# Patient Record
Sex: MALE | Race: WHITE | NOT HISPANIC OR LATINO | Employment: FULL TIME | URBAN - METROPOLITAN AREA
[De-identification: names, ages, dates, MRNs, and addresses within clinical notes are randomized per-mention and may not be internally consistent; named-entity substitution may affect disease eponyms.]

---

## 2023-01-19 PROBLEM — D58.2 ELEVATED HEMOGLOBIN (CMS-HCC): Status: ACTIVE | Noted: 2023-01-19

## 2023-01-19 PROBLEM — M54.50 CHRONIC LOW BACK PAIN: Status: ACTIVE | Noted: 2023-01-19

## 2023-01-19 PROBLEM — M54.42 ACUTE LEFT-SIDED LOW BACK PAIN WITH LEFT-SIDED SCIATICA: Status: ACTIVE | Noted: 2023-01-19

## 2023-01-19 PROBLEM — R11.15 CYCLIC VOMITING SYNDROME: Status: ACTIVE | Noted: 2023-01-19

## 2023-01-19 PROBLEM — E78.5 HYPERLIPIDEMIA: Status: ACTIVE | Noted: 2023-01-19

## 2023-01-19 PROBLEM — K21.9 GERD (GASTROESOPHAGEAL REFLUX DISEASE): Status: ACTIVE | Noted: 2023-01-19

## 2023-01-19 PROBLEM — G89.29 CHRONIC LOW BACK PAIN: Status: ACTIVE | Noted: 2023-01-19

## 2023-01-19 PROBLEM — D35.02 ADENOMA OF LEFT ADRENAL GLAND: Status: ACTIVE | Noted: 2023-01-19

## 2023-01-19 RX ORDER — METOCLOPRAMIDE 10 MG/1
1 TABLET ORAL 3 TIMES DAILY PRN
COMMUNITY
End: 2023-03-06 | Stop reason: SDUPTHER

## 2023-01-19 RX ORDER — OMEPRAZOLE 40 MG/1
1 CAPSULE, DELAYED RELEASE ORAL
COMMUNITY
End: 2023-03-06 | Stop reason: SDUPTHER

## 2023-01-19 RX ORDER — NORTRIPTYLINE HYDROCHLORIDE 50 MG/1
1 CAPSULE ORAL NIGHTLY
COMMUNITY
End: 2023-03-06 | Stop reason: SDUPTHER

## 2023-01-19 RX ORDER — ATORVASTATIN CALCIUM 40 MG/1
1 TABLET, FILM COATED ORAL EVERY EVENING
COMMUNITY
End: 2023-03-06 | Stop reason: SDUPTHER

## 2023-03-06 ENCOUNTER — OFFICE VISIT (OUTPATIENT)
Dept: PRIMARY CARE | Facility: CLINIC | Age: 58
End: 2023-03-06
Payer: COMMERCIAL

## 2023-03-06 VITALS
WEIGHT: 223 LBS | BODY MASS INDEX: 33.8 KG/M2 | RESPIRATION RATE: 20 BRPM | OXYGEN SATURATION: 95 % | HEART RATE: 102 BPM | HEIGHT: 68 IN | SYSTOLIC BLOOD PRESSURE: 131 MMHG | DIASTOLIC BLOOD PRESSURE: 90 MMHG | TEMPERATURE: 98.6 F

## 2023-03-06 DIAGNOSIS — E78.5 HYPERLIPIDEMIA, UNSPECIFIED HYPERLIPIDEMIA TYPE: ICD-10-CM

## 2023-03-06 DIAGNOSIS — D58.2 ELEVATED HEMOGLOBIN (CMS-HCC): ICD-10-CM

## 2023-03-06 DIAGNOSIS — K21.9 GASTROESOPHAGEAL REFLUX DISEASE WITHOUT ESOPHAGITIS: ICD-10-CM

## 2023-03-06 DIAGNOSIS — E53.8 VITAMIN B12 DEFICIENCY: ICD-10-CM

## 2023-03-06 DIAGNOSIS — R11.15 CYCLIC VOMITING SYNDROME: Primary | ICD-10-CM

## 2023-03-06 DIAGNOSIS — E55.9 VITAMIN D DEFICIENCY: ICD-10-CM

## 2023-03-06 PROCEDURE — 99214 OFFICE O/P EST MOD 30 MIN: CPT | Performed by: FAMILY MEDICINE

## 2023-03-06 RX ORDER — METOCLOPRAMIDE 10 MG/1
10 TABLET ORAL 3 TIMES DAILY PRN
Qty: 270 TABLET | Refills: 1 | Status: SHIPPED | OUTPATIENT
Start: 2023-03-06 | End: 2023-09-15

## 2023-03-06 RX ORDER — ATORVASTATIN CALCIUM 40 MG/1
40 TABLET, FILM COATED ORAL EVERY EVENING
Qty: 90 TABLET | Refills: 1 | Status: SHIPPED | OUTPATIENT
Start: 2023-03-06 | End: 2023-09-15

## 2023-03-06 RX ORDER — NORTRIPTYLINE HYDROCHLORIDE 50 MG/1
50 CAPSULE ORAL NIGHTLY
Qty: 90 CAPSULE | Refills: 1 | Status: SHIPPED | OUTPATIENT
Start: 2023-03-06 | End: 2023-09-15

## 2023-03-06 RX ORDER — OMEPRAZOLE 40 MG/1
40 CAPSULE, DELAYED RELEASE ORAL
Qty: 90 CAPSULE | Refills: 1 | Status: SHIPPED | OUTPATIENT
Start: 2023-03-06 | End: 2023-09-15

## 2023-03-06 NOTE — PROGRESS NOTES
"Subjective   Patient ID: Karel Perez is a 57 y.o. male who presents for Follow-up (6mo  fu for meds).    HPI   Present for med fu, no complaints. Patient comes in today for checkup and follow-up on his medication.  He is currently without complaints.  He claims he is taking his medicines as directed and is not having any side effects from them.    Review of Systems   All other systems reviewed and are negative.      Objective   /90   Pulse 102   Temp 37 °C (98.6 °F)   Resp 20   Ht 1.727 m (5' 8\")   Wt 101 kg (223 lb)   SpO2 95%   BMI 33.91 kg/m²     Physical Exam  Vitals reviewed.   Constitutional:       Appearance: He is well-developed. He is obese.   HENT:      Head: Normocephalic and atraumatic.      Right Ear: Tympanic membrane, ear canal and external ear normal.      Left Ear: Tympanic membrane, ear canal and external ear normal.      Nose: Nose normal.      Mouth/Throat:      Mouth: Mucous membranes are moist.      Pharynx: Oropharynx is clear.   Eyes:      Extraocular Movements: Extraocular movements intact.      Conjunctiva/sclera: Conjunctivae normal.      Pupils: Pupils are equal, round, and reactive to light.   Cardiovascular:      Rate and Rhythm: Normal rate and regular rhythm.      Heart sounds: Normal heart sounds. No murmur heard.  Pulmonary:      Effort: Pulmonary effort is normal.      Breath sounds: Normal breath sounds. No wheezing.   Abdominal:      General: Abdomen is flat. Bowel sounds are normal.      Palpations: Abdomen is soft.   Musculoskeletal:         General: Normal range of motion.   Skin:     General: Skin is warm and dry.   Neurological:      General: No focal deficit present.      Mental Status: He is alert and oriented to person, place, and time. Mental status is at baseline.   Psychiatric:         Mood and Affect: Mood normal.         Behavior: Behavior normal.         Thought Content: Thought content normal.         Judgment: Judgment normal.         Assessment/Plan "     Karel was seen today for follow-up.  Diagnoses and all orders for this visit:  Cyclic vomiting syndrome (Primary)  -     Comprehensive Metabolic Panel; Future  -     metoclopramide (Reglan) 10 mg tablet; Take 1 tablet (10 mg) by mouth 3 times a day as needed (nausea).  -     nortriptyline (Pamelor) 50 mg capsule; Take 1 capsule (50 mg) by mouth once daily at bedtime.  Gastroesophageal reflux disease without esophagitis  -     Comprehensive Metabolic Panel; Future  -     metoclopramide (Reglan) 10 mg tablet; Take 1 tablet (10 mg) by mouth 3 times a day as needed (nausea).  -     omeprazole (PriLOSEC) 40 mg DR capsule; Take 1 capsule (40 mg) by mouth once every day.  Elevated hemoglobin (CMS/HCC)  -     CBC; Future  Hyperlipidemia, unspecified hyperlipidemia type  -     Lipid Panel; Future  -     atorvastatin (Lipitor) 40 mg tablet; Take 1 tablet (40 mg) by mouth once daily in the evening.  Vitamin B12 deficiency  -     Vitamin B12; Future  Vitamin D deficiency  -     Vitamin D 1,25 Dihydroxy; Future  Will contact patient with lab results when available.  Continue current meds as directed.  Follow up in 6 months for recheck if all remains stable, sooner if problems arise.  Medication list reconciled.  Thank you for visiting today!      Professional services: Some of this note was completed using Dragon voice technology and sometimes the software misinterprets words. This may include unintended errors with respect to translation of words, typographical errors or grammar errors which may not have been identified prior to finalization of the chart note. Please take this into account when reading the note. Thank you.

## 2023-03-18 ENCOUNTER — LAB (OUTPATIENT)
Dept: LAB | Facility: LAB | Age: 58
End: 2023-03-18
Payer: COMMERCIAL

## 2023-03-18 DIAGNOSIS — E78.5 HYPERLIPIDEMIA, UNSPECIFIED HYPERLIPIDEMIA TYPE: ICD-10-CM

## 2023-03-18 DIAGNOSIS — K21.9 GASTROESOPHAGEAL REFLUX DISEASE WITHOUT ESOPHAGITIS: ICD-10-CM

## 2023-03-18 DIAGNOSIS — D58.2 ELEVATED HEMOGLOBIN (CMS-HCC): ICD-10-CM

## 2023-03-18 DIAGNOSIS — R11.15 CYCLIC VOMITING SYNDROME: ICD-10-CM

## 2023-03-18 DIAGNOSIS — E55.9 VITAMIN D DEFICIENCY: ICD-10-CM

## 2023-03-18 DIAGNOSIS — E53.8 VITAMIN B12 DEFICIENCY: ICD-10-CM

## 2023-03-18 LAB
ALANINE AMINOTRANSFERASE (SGPT) (U/L) IN SER/PLAS: 27 U/L (ref 10–52)
ALBUMIN (G/DL) IN SER/PLAS: 4.5 G/DL (ref 3.4–5)
ALKALINE PHOSPHATASE (U/L) IN SER/PLAS: 105 U/L (ref 33–120)
ANION GAP IN SER/PLAS: 13 MMOL/L (ref 10–20)
ASPARTATE AMINOTRANSFERASE (SGOT) (U/L) IN SER/PLAS: 17 U/L (ref 9–39)
BILIRUBIN TOTAL (MG/DL) IN SER/PLAS: 0.4 MG/DL (ref 0–1.2)
CALCIUM (MG/DL) IN SER/PLAS: 9.2 MG/DL (ref 8.6–10.3)
CARBON DIOXIDE, TOTAL (MMOL/L) IN SER/PLAS: 27 MMOL/L (ref 21–32)
CHLORIDE (MMOL/L) IN SER/PLAS: 105 MMOL/L (ref 98–107)
CHOLESTEROL (MG/DL) IN SER/PLAS: 147 MG/DL (ref 0–199)
CHOLESTEROL IN HDL (MG/DL) IN SER/PLAS: 48 MG/DL
CHOLESTEROL/HDL RATIO: 3.1
COBALAMIN (VITAMIN B12) (PG/ML) IN SER/PLAS: 384 PG/ML (ref 211–911)
CREATININE (MG/DL) IN SER/PLAS: 0.81 MG/DL (ref 0.5–1.3)
ERYTHROCYTE DISTRIBUTION WIDTH (RATIO) BY AUTOMATED COUNT: 13.3 % (ref 11.5–14.5)
ERYTHROCYTE MEAN CORPUSCULAR HEMOGLOBIN CONCENTRATION (G/DL) BY AUTOMATED: 33.2 G/DL (ref 32–36)
ERYTHROCYTE MEAN CORPUSCULAR VOLUME (FL) BY AUTOMATED COUNT: 94 FL (ref 80–100)
ERYTHROCYTES (10*6/UL) IN BLOOD BY AUTOMATED COUNT: 5.47 X10E12/L (ref 4.5–5.9)
GFR MALE: >90 ML/MIN/1.73M2
GLUCOSE (MG/DL) IN SER/PLAS: 113 MG/DL (ref 74–99)
HEMATOCRIT (%) IN BLOOD BY AUTOMATED COUNT: 51.5 % (ref 41–52)
HEMOGLOBIN (G/DL) IN BLOOD: 17.1 G/DL (ref 13.5–17.5)
LDL: 56 MG/DL (ref 0–99)
LEUKOCYTES (10*3/UL) IN BLOOD BY AUTOMATED COUNT: 8.6 X10E9/L (ref 4.4–11.3)
NON HDL CHOLESTEROL: 99 MG/DL
PLATELETS (10*3/UL) IN BLOOD AUTOMATED COUNT: 268 X10E9/L (ref 150–450)
POTASSIUM (MMOL/L) IN SER/PLAS: 4.7 MMOL/L (ref 3.5–5.3)
PROTEIN TOTAL: 6.9 G/DL (ref 6.4–8.2)
SODIUM (MMOL/L) IN SER/PLAS: 140 MMOL/L (ref 136–145)
TRIGLYCERIDE (MG/DL) IN SER/PLAS: 217 MG/DL (ref 0–149)
UREA NITROGEN (MG/DL) IN SER/PLAS: 11 MG/DL (ref 6–23)
VLDL: 43 MG/DL (ref 0–40)

## 2023-03-18 PROCEDURE — 36415 COLL VENOUS BLD VENIPUNCTURE: CPT

## 2023-03-18 PROCEDURE — 80061 LIPID PANEL: CPT

## 2023-03-18 PROCEDURE — 82652 VIT D 1 25-DIHYDROXY: CPT

## 2023-03-18 PROCEDURE — 80053 COMPREHEN METABOLIC PANEL: CPT

## 2023-03-18 PROCEDURE — 85027 COMPLETE CBC AUTOMATED: CPT

## 2023-03-18 PROCEDURE — 82607 VITAMIN B-12: CPT

## 2023-03-22 LAB — VITAMIN D 1,25-DIHYDROXY: 49.3 PG/ML (ref 19.9–79.3)

## 2023-03-23 ENCOUNTER — TELEPHONE (OUTPATIENT)
Dept: PRIMARY CARE | Facility: CLINIC | Age: 58
End: 2023-03-23
Payer: COMMERCIAL

## 2023-03-23 NOTE — TELEPHONE ENCOUNTER
----- Message from Chano Scott DO sent at 3/22/2023  7:13 AM EDT -----  Regarding: Labs    Please notify patient of good labs.  Recheck in 1 year.  ----- Message -----  From: Lab, Background User  Sent: 3/18/2023   7:09 PM EDT  To: Chano Scott DO

## 2023-09-13 DIAGNOSIS — E78.5 HYPERLIPIDEMIA, UNSPECIFIED HYPERLIPIDEMIA TYPE: ICD-10-CM

## 2023-09-13 DIAGNOSIS — K21.9 GASTROESOPHAGEAL REFLUX DISEASE WITHOUT ESOPHAGITIS: ICD-10-CM

## 2023-09-13 DIAGNOSIS — R11.15 CYCLIC VOMITING SYNDROME: ICD-10-CM

## 2023-09-15 RX ORDER — NORTRIPTYLINE HYDROCHLORIDE 50 MG/1
50 CAPSULE ORAL
Qty: 90 CAPSULE | Refills: 1 | Status: SHIPPED | OUTPATIENT
Start: 2023-09-15 | End: 2024-03-12

## 2023-09-15 RX ORDER — ATORVASTATIN CALCIUM 40 MG/1
40 TABLET, FILM COATED ORAL EVERY EVENING
Qty: 90 TABLET | Refills: 1 | Status: SHIPPED | OUTPATIENT
Start: 2023-09-15 | End: 2024-03-12

## 2023-09-15 RX ORDER — METOCLOPRAMIDE 10 MG/1
10 TABLET ORAL 3 TIMES DAILY PRN
Qty: 270 TABLET | Refills: 1 | Status: SHIPPED | OUTPATIENT
Start: 2023-09-15 | End: 2024-03-15 | Stop reason: SDUPTHER

## 2023-09-15 RX ORDER — OMEPRAZOLE 40 MG/1
40 CAPSULE, DELAYED RELEASE ORAL DAILY
Qty: 90 CAPSULE | Refills: 1 | Status: SHIPPED | OUTPATIENT
Start: 2023-09-15 | End: 2024-03-12

## 2023-09-15 NOTE — TELEPHONE ENCOUNTER
Requested Prescriptions     Pending Prescriptions Disp Refills    omeprazole (PriLOSEC) 40 mg DR capsule [Pharmacy Med Name: omeprazole 40 mg capsule,delayed release] 90 capsule 1     Sig: TAKE 1 CAPSULE BY MOUTH ONCE DAILY    metoclopramide (Reglan) 10 mg tablet [Pharmacy Med Name: metoclopramide 10 mg tablet] 270 tablet 1     Sig: Take 1 tablet (10 mg) by mouth 3 times a day as needed (nausea).    atorvastatin (Lipitor) 40 mg tablet [Pharmacy Med Name: atorvastatin 40 mg tablet] 90 tablet 1     Sig: TAKE 1 TABLET BY MOUTH DAILY IN THE EVENING    nortriptyline (Pamelor) 50 mg capsule [Pharmacy Med Name: nortriptyline 50 mg capsule] 90 capsule 1     Sig: TAKE 1 CAPSULE BY MOUTH DAILY AT BEDTIME

## 2023-09-18 ENCOUNTER — OFFICE VISIT (OUTPATIENT)
Dept: PRIMARY CARE | Facility: CLINIC | Age: 58
End: 2023-09-18
Payer: COMMERCIAL

## 2023-09-18 VITALS
DIASTOLIC BLOOD PRESSURE: 89 MMHG | RESPIRATION RATE: 20 BRPM | TEMPERATURE: 98.4 F | OXYGEN SATURATION: 94 % | HEART RATE: 110 BPM | WEIGHT: 223 LBS | SYSTOLIC BLOOD PRESSURE: 148 MMHG | HEIGHT: 68 IN | BODY MASS INDEX: 33.8 KG/M2

## 2023-09-18 DIAGNOSIS — G89.29 CHRONIC LOW BACK PAIN, UNSPECIFIED BACK PAIN LATERALITY, UNSPECIFIED WHETHER SCIATICA PRESENT: ICD-10-CM

## 2023-09-18 DIAGNOSIS — K21.9 GASTROESOPHAGEAL REFLUX DISEASE WITHOUT ESOPHAGITIS: Primary | ICD-10-CM

## 2023-09-18 DIAGNOSIS — R11.15 CYCLIC VOMITING SYNDROME: ICD-10-CM

## 2023-09-18 DIAGNOSIS — D35.02 ADENOMA OF LEFT ADRENAL GLAND: ICD-10-CM

## 2023-09-18 DIAGNOSIS — Z23 ENCOUNTER FOR IMMUNIZATION: ICD-10-CM

## 2023-09-18 DIAGNOSIS — M54.50 CHRONIC LOW BACK PAIN, UNSPECIFIED BACK PAIN LATERALITY, UNSPECIFIED WHETHER SCIATICA PRESENT: ICD-10-CM

## 2023-09-18 DIAGNOSIS — E78.5 HYPERLIPIDEMIA, UNSPECIFIED HYPERLIPIDEMIA TYPE: ICD-10-CM

## 2023-09-18 PROCEDURE — 90686 IIV4 VACC NO PRSV 0.5 ML IM: CPT | Performed by: FAMILY MEDICINE

## 2023-09-18 PROCEDURE — 99213 OFFICE O/P EST LOW 20 MIN: CPT | Performed by: FAMILY MEDICINE

## 2023-09-18 PROCEDURE — 90471 IMMUNIZATION ADMIN: CPT | Performed by: FAMILY MEDICINE

## 2023-09-18 NOTE — PROGRESS NOTES
Subjective   Patient ID: Karel Perez is a 57 y.o. male who presents for Follow-up (6 mo med fu/).    HPI   Patient comes in today for checkup and refill of medications.  He currently is without complaints.  He would like a flu vaccine today.    3/6/2023  Present for med fu, no complaints. Patient comes in today for checkup and follow-up on his medication.  He is currently without complaints.  He claims he is taking his medicines as directed and is not having any side effects from them.    3/24/2022  Patient presents today for 6-month checkup and refill of meds. He states that he is taking his medicines as directed and is not having any side effects from them. He however does not feel like they are working for him anymore. He is wondering if the dosage can be upped. He has been on 25 mg dose of nortriptyline for many years. It has always worked well for him but not now.    9/10/2021  Patient presents today for 6-month checkup and refill of meds. He currently is without complaints. He states that he is taking his medicines as directed and is not having any side effects from them. Patient is due for recheck of his lipids and CBC.    3/22/2021  Patient presents today for checkup and refill of meds. He currently is without complaints. He states that he is taking his medicines as directed and is not having any side effects from them. His wife had Covid back in January but he did not. He has been working through the pandemic and needs refills of his medications. He is also in need of lab work and the recheck of the CT scan of his left adrenal gland. The left adrenal was last looked at in 2017 at [facility]. His blood pressure and weight have remained relatively stable. He has lost 8 pounds since last March before Covid    9/9/2020  Patient presents today for checkup and refill of meds. He currently is without complaints. He states that he is taking his medicines as directed and is not having any side effects from them. 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 He claims he has been staying healthy.    3/10/2020  Patient comes in today for evaluation of his back. He had surgery on the back by Dr. Bc pro in 2007 and has chronic flareups of the back pain since then. Today he is complaining of left lower back pain going down the left leg. It has been going on for about a month. 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    3/12/2019   The patient is a 53 year old male who presents for a medication evaluation. The patient feels well with no complaints (Pt is here for a follow up on his meds he also needs refills.). Note for \"Medication Evaluation\": Patient comes in today for checkup and follow-up on his cyclic vomiting syndrome.  He has not been having any health issues and has not been in the hospital for his vomiting issues since January 2018.  He has been doing well.  He would like a refill of his medications.    10/17/17  Patient comes in today for follow-up. This is only her second visit to the practice. He tells me that he has been to the ER twice in the last few weeks with CVS or cyclic vomiting syndrome which was diagnosed at [facility] by Dr. No. He strongly believes the only reason he had was because he ran out of his medication. He thinks the medication is working very well. The 2 episodes occurred on evenings he hadn't taken his medicine.     He had been a former patient with [facility] and now is with [facility]. 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 He tells me that the CVS is well controlled as long as he takes nortriptyline 25 mg daily.    He does have a history of back surgery was Dr. Yancey at [facility] many years ago. He states that since his surgery he has had chronic pain.    Patient is here today with a report from 1/31/17 [facility] ER showing a CT scan with a small adenoma on the left adrenal gland. He " was instructed to get a follow-up study.     Review of Systems   All other systems reviewed and are negative.      Objective   /89   Pulse 110   Temp 36.9 °C (98.4 °F)   Resp 20   SpO2 94%     Physical Exam  Vitals reviewed.   Constitutional:       Appearance: He is well-developed. He is obese.   HENT:      Head: Normocephalic and atraumatic.      Right Ear: Tympanic membrane, ear canal and external ear normal.      Left Ear: Tympanic membrane, ear canal and external ear normal.      Nose: Nose normal.      Mouth/Throat:      Mouth: Mucous membranes are moist.      Pharynx: Oropharynx is clear.   Eyes:      Extraocular Movements: Extraocular movements intact.      Conjunctiva/sclera: Conjunctivae normal.      Pupils: Pupils are equal, round, and reactive to light.   Cardiovascular:      Rate and Rhythm: Normal rate and regular rhythm.      Heart sounds: Normal heart sounds. No murmur heard.  Pulmonary:      Effort: Pulmonary effort is normal.      Breath sounds: Normal breath sounds. No wheezing.   Abdominal:      General: Abdomen is flat. Bowel sounds are normal.      Palpations: Abdomen is soft.   Musculoskeletal:         General: Normal range of motion.   Skin:     General: Skin is warm and dry.   Neurological:      General: No focal deficit present.      Mental Status: He is alert and oriented to person, place, and time. Mental status is at baseline.   Psychiatric:         Mood and Affect: Mood normal.         Behavior: Behavior normal.         Thought Content: Thought content normal.         Judgment: Judgment normal.         Assessment/Plan   Problem List Items Addressed This Visit       Adenoma of left adrenal gland    Chronic low back pain    Cyclic vomiting syndrome    GERD (gastroesophageal reflux disease) - Primary    Hyperlipidemia    Encounter for immunization    Relevant Orders    Flu vaccine (IIV4) age 6 months and greater, preservative free   Continue current meds as directed.  Follow up in  6 months for recheck if all remains stable, sooner if problems arise.  Medication list reconciled.  Thank you for visiting today!      Professional services: Some of this note was completed using Dragon voice technology and sometimes the software misinterprets words. This may include unintended errors with respect to translation of words, typographical errors or grammar errors which may not have been identified prior to finalization of the chart note. Please take this into account when reading the note. Thank you.

## 2024-03-12 DIAGNOSIS — K21.9 GASTROESOPHAGEAL REFLUX DISEASE WITHOUT ESOPHAGITIS: ICD-10-CM

## 2024-03-12 DIAGNOSIS — E78.5 HYPERLIPIDEMIA, UNSPECIFIED HYPERLIPIDEMIA TYPE: ICD-10-CM

## 2024-03-12 DIAGNOSIS — R11.15 CYCLIC VOMITING SYNDROME: ICD-10-CM

## 2024-03-12 RX ORDER — NORTRIPTYLINE HYDROCHLORIDE 50 MG/1
50 CAPSULE ORAL NIGHTLY
Qty: 30 CAPSULE | Refills: 0 | Status: SHIPPED | OUTPATIENT
Start: 2024-03-12 | End: 2024-04-15

## 2024-03-12 RX ORDER — ATORVASTATIN CALCIUM 40 MG/1
40 TABLET, FILM COATED ORAL EVERY EVENING
Qty: 30 TABLET | Refills: 0 | Status: SHIPPED | OUTPATIENT
Start: 2024-03-12 | End: 2024-04-15

## 2024-03-12 RX ORDER — OMEPRAZOLE 40 MG/1
40 CAPSULE, DELAYED RELEASE ORAL DAILY
Qty: 30 CAPSULE | Refills: 0 | Status: SHIPPED | OUTPATIENT
Start: 2024-03-12 | End: 2024-04-15

## 2024-03-15 ENCOUNTER — OFFICE VISIT (OUTPATIENT)
Dept: PRIMARY CARE | Facility: CLINIC | Age: 59
End: 2024-03-15
Payer: COMMERCIAL

## 2024-03-15 ENCOUNTER — LAB (OUTPATIENT)
Dept: LAB | Facility: LAB | Age: 59
End: 2024-03-15
Payer: COMMERCIAL

## 2024-03-15 VITALS
DIASTOLIC BLOOD PRESSURE: 87 MMHG | SYSTOLIC BLOOD PRESSURE: 131 MMHG | TEMPERATURE: 98 F | OXYGEN SATURATION: 96 % | RESPIRATION RATE: 16 BRPM | WEIGHT: 224 LBS | HEART RATE: 107 BPM | BODY MASS INDEX: 34.06 KG/M2

## 2024-03-15 DIAGNOSIS — Z83.3 FAMILY HISTORY OF DIABETES MELLITUS (DM): ICD-10-CM

## 2024-03-15 DIAGNOSIS — E78.5 HYPERLIPIDEMIA, UNSPECIFIED HYPERLIPIDEMIA TYPE: Primary | ICD-10-CM

## 2024-03-15 DIAGNOSIS — E55.9 VITAMIN D DEFICIENCY: ICD-10-CM

## 2024-03-15 DIAGNOSIS — R11.15 CYCLIC VOMITING SYNDROME: ICD-10-CM

## 2024-03-15 DIAGNOSIS — D58.2 ELEVATED HEMOGLOBIN (CMS-HCC): ICD-10-CM

## 2024-03-15 DIAGNOSIS — N40.0 BENIGN PROSTATIC HYPERPLASIA, UNSPECIFIED WHETHER LOWER URINARY TRACT SYMPTOMS PRESENT: ICD-10-CM

## 2024-03-15 DIAGNOSIS — K21.9 GASTROESOPHAGEAL REFLUX DISEASE WITHOUT ESOPHAGITIS: ICD-10-CM

## 2024-03-15 DIAGNOSIS — E53.8 VITAMIN B12 DEFICIENCY: ICD-10-CM

## 2024-03-15 DIAGNOSIS — E78.5 HYPERLIPIDEMIA, UNSPECIFIED HYPERLIPIDEMIA TYPE: ICD-10-CM

## 2024-03-15 LAB
25(OH)D3 SERPL-MCNC: 27 NG/ML (ref 30–100)
ALBUMIN SERPL BCP-MCNC: 4.4 G/DL (ref 3.4–5)
ALP SERPL-CCNC: 99 U/L (ref 33–120)
ALT SERPL W P-5'-P-CCNC: 21 U/L (ref 10–52)
ANION GAP SERPL CALC-SCNC: 12 MMOL/L (ref 10–20)
AST SERPL W P-5'-P-CCNC: 15 U/L (ref 9–39)
BILIRUB SERPL-MCNC: 0.5 MG/DL (ref 0–1.2)
BUN SERPL-MCNC: 11 MG/DL (ref 6–23)
CALCIUM SERPL-MCNC: 9.3 MG/DL (ref 8.6–10.3)
CHLORIDE SERPL-SCNC: 106 MMOL/L (ref 98–107)
CHOLEST SERPL-MCNC: 131 MG/DL (ref 0–199)
CHOLESTEROL/HDL RATIO: 2.8
CO2 SERPL-SCNC: 27 MMOL/L (ref 21–32)
CREAT SERPL-MCNC: 0.75 MG/DL (ref 0.5–1.3)
EGFRCR SERPLBLD CKD-EPI 2021: >90 ML/MIN/1.73M*2
ERYTHROCYTE [DISTWIDTH] IN BLOOD BY AUTOMATED COUNT: 13.3 % (ref 11.5–14.5)
EST. AVERAGE GLUCOSE BLD GHB EST-MCNC: 117 MG/DL
GLUCOSE SERPL-MCNC: 111 MG/DL (ref 74–99)
HBA1C MFR BLD: 5.7 %
HCT VFR BLD AUTO: 50.4 % (ref 41–52)
HDLC SERPL-MCNC: 46.3 MG/DL
HGB BLD-MCNC: 17.3 G/DL (ref 13.5–17.5)
LDLC SERPL CALC-MCNC: 61 MG/DL
MCH RBC QN AUTO: 31.5 PG (ref 26–34)
MCHC RBC AUTO-ENTMCNC: 34.3 G/DL (ref 32–36)
MCV RBC AUTO: 92 FL (ref 80–100)
NON HDL CHOLESTEROL: 85 MG/DL (ref 0–149)
NRBC BLD-RTO: 0 /100 WBCS (ref 0–0)
PLATELET # BLD AUTO: 260 X10*3/UL (ref 150–450)
POTASSIUM SERPL-SCNC: 4.2 MMOL/L (ref 3.5–5.3)
PROT SERPL-MCNC: 6.9 G/DL (ref 6.4–8.2)
PSA SERPL-MCNC: 2.23 NG/ML
RBC # BLD AUTO: 5.49 X10*6/UL (ref 4.5–5.9)
SODIUM SERPL-SCNC: 141 MMOL/L (ref 136–145)
TRIGL SERPL-MCNC: 119 MG/DL (ref 0–149)
VIT B12 SERPL-MCNC: 405 PG/ML (ref 211–911)
VLDL: 24 MG/DL (ref 0–40)
WBC # BLD AUTO: 7.3 X10*3/UL (ref 4.4–11.3)

## 2024-03-15 PROCEDURE — 80061 LIPID PANEL: CPT

## 2024-03-15 PROCEDURE — 83036 HEMOGLOBIN GLYCOSYLATED A1C: CPT

## 2024-03-15 PROCEDURE — 99214 OFFICE O/P EST MOD 30 MIN: CPT | Performed by: FAMILY MEDICINE

## 2024-03-15 PROCEDURE — 36415 COLL VENOUS BLD VENIPUNCTURE: CPT

## 2024-03-15 PROCEDURE — 82306 VITAMIN D 25 HYDROXY: CPT

## 2024-03-15 PROCEDURE — 84153 ASSAY OF PSA TOTAL: CPT

## 2024-03-15 PROCEDURE — 85027 COMPLETE CBC AUTOMATED: CPT

## 2024-03-15 PROCEDURE — 82607 VITAMIN B-12: CPT

## 2024-03-15 PROCEDURE — 80053 COMPREHEN METABOLIC PANEL: CPT

## 2024-03-15 RX ORDER — METOCLOPRAMIDE 10 MG/1
10 TABLET ORAL 3 TIMES DAILY PRN
Qty: 270 TABLET | Refills: 1 | Status: SHIPPED | OUTPATIENT
Start: 2024-03-15 | End: 2024-03-15 | Stop reason: SDUPTHER

## 2024-03-15 RX ORDER — METOCLOPRAMIDE 10 MG/1
10 TABLET ORAL 3 TIMES DAILY PRN
Qty: 270 TABLET | Refills: 1 | Status: SHIPPED | OUTPATIENT
Start: 2024-03-15

## 2024-03-15 NOTE — PROGRESS NOTES
Subjective   Patient ID: Karel Perez is a 58 y.o. male who presents for Follow-up (6 mo med fu. No questions, concerns, or complaints. //).    HPI       9/18/2023  Patient comes in today for checkup and refill of medications.  He currently is without complaints.  He would like a flu vaccine today.    3/6/2023  Present for med fu, no complaints. Patient comes in today for checkup and follow-up on his medication.  He is currently without complaints.  He claims he is taking his medicines as directed and is not having any side effects from them.    Review of Systems   All other systems reviewed and are negative.      Objective   /87   Pulse 107   Temp 36.7 °C (98 °F)   Resp 16   Wt 102 kg (224 lb)   SpO2 96%   BMI 34.06 kg/m²     Physical Exam  Vitals reviewed.   Constitutional:       Appearance: He is well-developed. He is obese.   HENT:      Head: Normocephalic and atraumatic.      Right Ear: Tympanic membrane, ear canal and external ear normal.      Left Ear: Tympanic membrane, ear canal and external ear normal.      Nose: Nose normal.      Mouth/Throat:      Mouth: Mucous membranes are moist.      Pharynx: Oropharynx is clear.   Eyes:      Extraocular Movements: Extraocular movements intact.      Conjunctiva/sclera: Conjunctivae normal.      Pupils: Pupils are equal, round, and reactive to light.   Cardiovascular:      Rate and Rhythm: Normal rate and regular rhythm.      Heart sounds: Normal heart sounds. No murmur heard.  Pulmonary:      Effort: Pulmonary effort is normal.      Breath sounds: Normal breath sounds. No wheezing.   Abdominal:      General: Abdomen is flat. Bowel sounds are normal.      Palpations: Abdomen is soft.   Musculoskeletal:         General: Normal range of motion.   Skin:     General: Skin is warm and dry.   Neurological:      General: No focal deficit present.      Mental Status: He is alert and oriented to person, place, and time. Mental status is at baseline.   Psychiatric:          Mood and Affect: Mood normal.         Behavior: Behavior normal.         Thought Content: Thought content normal.         Judgment: Judgment normal.       Assessment/Plan   Problem List Items Addressed This Visit             ICD-10-CM    Cyclic vomiting syndrome R11.15    Relevant Medications    metoclopramide (Reglan) 10 mg tablet    Other Relevant Orders    Comprehensive Metabolic Panel (Completed)    Elevated hemoglobin (CMS/HCC) D58.2    GERD (gastroesophageal reflux disease) K21.9    Relevant Medications    metoclopramide (Reglan) 10 mg tablet    Hyperlipidemia - Primary E78.5    Relevant Orders    Lipid Panel (Completed)     Other Visit Diagnoses         Codes    Vitamin B12 deficiency     E53.8    Relevant Orders    CBC (Completed)    Vitamin B12 (Completed)    Vitamin D deficiency     E55.9    Relevant Orders    Vitamin D 25-Hydroxy,Total (for eval of Vitamin D levels) (Completed)    Family history of diabetes mellitus (DM)     Z83.3    Relevant Orders    Hemoglobin A1C (Completed)    Benign prostatic hyperplasia, unspecified whether lower urinary tract symptoms present     N40.0    Relevant Orders    Prostate Specific Antigen (Completed)        Will contact patient with lab results when available.  Medication list reconciled.  Thank you for visiting today!      Professional services: Some of this note was completed using Dragon voice technology and sometimes the software misinterprets words. This may include unintended errors with respect to translation of words, typographical errors or grammar errors which may not have been identified prior to finalization of the chart note. Please take this into account when reading the note. Thank you.

## 2024-03-20 NOTE — RESULT ENCOUNTER NOTE
Please notify patient of low vitamin D of 27.  It should be between 30 and 100 the closer to 50 the better. It is an important vitamin for your heart, lungs, immune system and bones among other things in your body. Would recommend over the counter vitamin D3 2000 units daily to get it into and keep it in the normal range and recheck in July 2024.     All the rest of his labs are excellent.  Continue current medicines and recheck in 1 year.

## 2024-03-20 NOTE — TELEPHONE ENCOUNTER
----- Message from Chano Scott DO sent at 3/20/2024  8:58 AM EDT -----  Please notify patient of low vitamin D of 27.  It should be between 30 and 100 the closer to 50 the better. It is an important vitamin for your heart, lungs, immune system and bones among other things in your body. Would recommend over the counter vitamin D3 2000 units daily to get it into and keep it in the normal range and recheck in July 2024.     All the rest of his labs are excellent.  Continue current medicines and recheck in 1 year.

## 2024-04-15 DIAGNOSIS — K21.9 GASTROESOPHAGEAL REFLUX DISEASE WITHOUT ESOPHAGITIS: ICD-10-CM

## 2024-04-15 DIAGNOSIS — R11.15 CYCLIC VOMITING SYNDROME: ICD-10-CM

## 2024-04-15 DIAGNOSIS — E78.5 HYPERLIPIDEMIA, UNSPECIFIED HYPERLIPIDEMIA TYPE: ICD-10-CM

## 2024-04-15 RX ORDER — OMEPRAZOLE 40 MG/1
40 CAPSULE, DELAYED RELEASE ORAL DAILY
Qty: 90 CAPSULE | Refills: 1 | Status: SHIPPED | OUTPATIENT
Start: 2024-04-15 | End: 2024-10-12

## 2024-04-15 RX ORDER — ATORVASTATIN CALCIUM 40 MG/1
40 TABLET, FILM COATED ORAL EVERY EVENING
Qty: 90 TABLET | Refills: 1 | Status: SHIPPED | OUTPATIENT
Start: 2024-04-15 | End: 2024-10-12

## 2024-04-15 RX ORDER — NORTRIPTYLINE HYDROCHLORIDE 50 MG/1
50 CAPSULE ORAL NIGHTLY
Qty: 90 CAPSULE | Refills: 1 | Status: SHIPPED | OUTPATIENT
Start: 2024-04-15

## 2024-07-17 ENCOUNTER — TELEPHONE (OUTPATIENT)
Dept: PRIMARY CARE | Facility: CLINIC | Age: 59
End: 2024-07-17
Payer: COMMERCIAL

## 2024-07-17 DIAGNOSIS — E55.9 VITAMIN D DEFICIENCY: ICD-10-CM

## 2024-07-17 NOTE — TELEPHONE ENCOUNTER
Patient called in stating that he would like to get his upcoming labs done for Dr. Scott.  Patient states that he wanted to recheck is Vitamin D3 in July.  Patient is asking for an order to be put in the chart.  Patient can be reached at [phone].      Thank You

## 2024-07-20 ENCOUNTER — LAB (OUTPATIENT)
Dept: LAB | Facility: LAB | Age: 59
End: 2024-07-20
Payer: COMMERCIAL

## 2024-07-20 DIAGNOSIS — E55.9 VITAMIN D DEFICIENCY: ICD-10-CM

## 2024-07-20 LAB — 25(OH)D3 SERPL-MCNC: 41 NG/ML (ref 30–100)

## 2024-07-20 PROCEDURE — 82306 VITAMIN D 25 HYDROXY: CPT

## 2024-07-20 PROCEDURE — 36415 COLL VENOUS BLD VENIPUNCTURE: CPT

## 2024-10-18 ENCOUNTER — LAB (OUTPATIENT)
Dept: LAB | Facility: LAB | Age: 59
End: 2024-10-18
Payer: COMMERCIAL

## 2024-10-18 ENCOUNTER — APPOINTMENT (OUTPATIENT)
Dept: PRIMARY CARE | Facility: CLINIC | Age: 59
End: 2024-10-18
Payer: COMMERCIAL

## 2024-10-18 DIAGNOSIS — Z23 ENCOUNTER FOR IMMUNIZATION: ICD-10-CM

## 2024-10-18 DIAGNOSIS — R11.15 CYCLIC VOMITING SYNDROME: ICD-10-CM

## 2024-10-18 DIAGNOSIS — E78.5 HYPERLIPIDEMIA, UNSPECIFIED HYPERLIPIDEMIA TYPE: ICD-10-CM

## 2024-10-18 DIAGNOSIS — K21.9 GASTROESOPHAGEAL REFLUX DISEASE WITHOUT ESOPHAGITIS: ICD-10-CM

## 2024-10-18 DIAGNOSIS — E55.9 VITAMIN D DEFICIENCY: ICD-10-CM

## 2024-10-18 DIAGNOSIS — E55.9 VITAMIN D DEFICIENCY: Primary | ICD-10-CM

## 2024-10-18 LAB — 25(OH)D3 SERPL-MCNC: 39 NG/ML (ref 30–100)

## 2024-10-18 PROCEDURE — 36415 COLL VENOUS BLD VENIPUNCTURE: CPT

## 2024-10-18 PROCEDURE — 99214 OFFICE O/P EST MOD 30 MIN: CPT | Performed by: FAMILY MEDICINE

## 2024-10-18 PROCEDURE — 90656 IIV3 VACC NO PRSV 0.5 ML IM: CPT | Performed by: FAMILY MEDICINE

## 2024-10-18 PROCEDURE — 90471 IMMUNIZATION ADMIN: CPT | Performed by: FAMILY MEDICINE

## 2024-10-18 PROCEDURE — 82306 VITAMIN D 25 HYDROXY: CPT

## 2024-10-18 RX ORDER — ATORVASTATIN CALCIUM 40 MG/1
40 TABLET, FILM COATED ORAL EVERY EVENING
Qty: 90 TABLET | Refills: 1 | Status: SHIPPED | OUTPATIENT
Start: 2024-10-18 | End: 2025-04-16

## 2024-10-18 RX ORDER — METOCLOPRAMIDE 10 MG/1
10 TABLET ORAL 3 TIMES DAILY PRN
Qty: 270 TABLET | Refills: 1 | Status: SHIPPED | OUTPATIENT
Start: 2024-10-18

## 2024-10-18 RX ORDER — NORTRIPTYLINE HYDROCHLORIDE 50 MG/1
50 CAPSULE ORAL NIGHTLY
Qty: 90 CAPSULE | Refills: 1 | Status: SHIPPED | OUTPATIENT
Start: 2024-10-18 | End: 2025-04-16

## 2024-10-18 RX ORDER — OMEPRAZOLE 40 MG/1
40 CAPSULE, DELAYED RELEASE ORAL DAILY
Qty: 90 CAPSULE | Refills: 1 | Status: SHIPPED | OUTPATIENT
Start: 2024-10-18 | End: 2025-04-16

## 2024-10-18 NOTE — PROGRESS NOTES
Subjective   Patient ID: Karel Perez is a 58 y.o. male who presents for Follow-up (6 mo med fu./No questions, concerns, or complaints. /).    HPI  Patient presents today for 6-month checkup and refill of meds. He currently is without complaints. He states that he is taking his medicines as directed and is not having any side effects from them.    Review of Systems   All other systems reviewed and are negative.      Objective   Vitals:  /88   Pulse 102   Temp 36.7 °C (98 °F)   Resp 20   Wt 99.8 kg (220 lb)   SpO2 91%   BMI 33.45 kg/m²     Physical Exam  Vitals reviewed.   Constitutional:       Appearance: He is well-developed. He is obese.   HENT:      Head: Normocephalic and atraumatic.      Right Ear: Tympanic membrane, ear canal and external ear normal.      Left Ear: Tympanic membrane, ear canal and external ear normal.      Nose: Nose normal.      Mouth/Throat:      Mouth: Mucous membranes are moist.      Pharynx: Oropharynx is clear.   Eyes:      Extraocular Movements: Extraocular movements intact.      Conjunctiva/sclera: Conjunctivae normal.      Pupils: Pupils are equal, round, and reactive to light.   Cardiovascular:      Rate and Rhythm: Normal rate and regular rhythm.      Heart sounds: Normal heart sounds. No murmur heard.  Pulmonary:      Effort: Pulmonary effort is normal.      Breath sounds: Normal breath sounds. No wheezing.   Abdominal:      General: Abdomen is flat. Bowel sounds are normal.      Palpations: Abdomen is soft.   Musculoskeletal:         General: Normal range of motion.   Skin:     General: Skin is warm and dry.   Neurological:      General: No focal deficit present.      Mental Status: He is alert and oriented to person, place, and time. Mental status is at baseline.   Psychiatric:         Mood and Affect: Mood normal.         Behavior: Behavior normal.         Thought Content: Thought content normal.         Judgment: Judgment normal.         Assessment/Plan   Problem  List Items Addressed This Visit       Cyclic vomiting syndrome    Relevant Medications    metoclopramide (Reglan) 10 mg tablet    nortriptyline (Pamelor) 50 mg capsule    GERD (gastroesophageal reflux disease)    Relevant Medications    metoclopramide (Reglan) 10 mg tablet    omeprazole (PriLOSEC) 40 mg DR capsule    Hyperlipidemia    Relevant Medications    atorvastatin (Lipitor) 40 mg tablet     Other Visit Diagnoses       Vitamin D deficiency    -  Primary    Relevant Orders    Vitamin D 25-Hydroxy,Total (for eval of Vitamin D levels) (Completed)        Continue current meds as directed.  Follow up in 6 months for recheck if all remains stable, sooner if problems arise.  Medication list reconciled.  Thank you for visiting today!      Professional services: Some of this note was completed using Dragon voice technology and sometimes the software misinterprets words. This may include unintended errors with respect to translation of words, typographical errors or grammar errors which may not have been identified prior to finalization of the chart note. Please take this into account when reading the note. Thank you.       Chano Scott DO 10/19/24 10:21 AM

## 2024-10-19 VITALS
WEIGHT: 220 LBS | HEART RATE: 102 BPM | OXYGEN SATURATION: 91 % | RESPIRATION RATE: 20 BRPM | BODY MASS INDEX: 33.45 KG/M2 | SYSTOLIC BLOOD PRESSURE: 138 MMHG | TEMPERATURE: 98 F | DIASTOLIC BLOOD PRESSURE: 88 MMHG

## 2025-03-19 ENCOUNTER — TELEPHONE (OUTPATIENT)
Dept: PRIMARY CARE | Facility: CLINIC | Age: 60
End: 2025-03-19
Payer: COMMERCIAL

## 2025-03-19 NOTE — TELEPHONE ENCOUNTER
Pt requesting letter for excuse from jury duty due to his rare condition, cyclic vomiting syndrome, that he is treated for by PCP

## 2025-04-17 ENCOUNTER — APPOINTMENT (OUTPATIENT)
Dept: PRIMARY CARE | Facility: CLINIC | Age: 60
End: 2025-04-17
Payer: COMMERCIAL

## 2025-04-17 VITALS
WEIGHT: 226 LBS | TEMPERATURE: 99 F | SYSTOLIC BLOOD PRESSURE: 134 MMHG | HEART RATE: 94 BPM | OXYGEN SATURATION: 91 % | DIASTOLIC BLOOD PRESSURE: 87 MMHG | RESPIRATION RATE: 20 BRPM | BODY MASS INDEX: 34.36 KG/M2

## 2025-04-17 DIAGNOSIS — Z13.228 SCREENING FOR METABOLIC DISORDER: ICD-10-CM

## 2025-04-17 DIAGNOSIS — E55.9 VITAMIN D DEFICIENCY: ICD-10-CM

## 2025-04-17 DIAGNOSIS — K21.9 GASTROESOPHAGEAL REFLUX DISEASE WITHOUT ESOPHAGITIS: ICD-10-CM

## 2025-04-17 DIAGNOSIS — E78.5 HYPERLIPIDEMIA, UNSPECIFIED HYPERLIPIDEMIA TYPE: Primary | ICD-10-CM

## 2025-04-17 DIAGNOSIS — N40.0 BENIGN PROSTATIC HYPERPLASIA, UNSPECIFIED WHETHER LOWER URINARY TRACT SYMPTOMS PRESENT: ICD-10-CM

## 2025-04-17 DIAGNOSIS — R11.15 CYCLIC VOMITING SYNDROME: ICD-10-CM

## 2025-04-17 DIAGNOSIS — E78.2 HYPERLIPIDEMIA, MIXED: ICD-10-CM

## 2025-04-17 DIAGNOSIS — E53.8 VITAMIN B12 DEFICIENCY: ICD-10-CM

## 2025-04-17 DIAGNOSIS — R73.9 HYPERGLYCEMIA: ICD-10-CM

## 2025-04-17 PROCEDURE — 99214 OFFICE O/P EST MOD 30 MIN: CPT | Performed by: FAMILY MEDICINE

## 2025-04-17 RX ORDER — OMEPRAZOLE 40 MG/1
40 CAPSULE, DELAYED RELEASE ORAL DAILY
Qty: 90 CAPSULE | Refills: 1 | Status: SHIPPED | OUTPATIENT
Start: 2025-04-17 | End: 2025-10-14

## 2025-04-17 RX ORDER — ATORVASTATIN CALCIUM 40 MG/1
40 TABLET, FILM COATED ORAL EVERY EVENING
Qty: 90 TABLET | Refills: 1 | Status: SHIPPED | OUTPATIENT
Start: 2025-04-17 | End: 2025-10-14

## 2025-04-17 RX ORDER — NORTRIPTYLINE HYDROCHLORIDE 50 MG/1
50 CAPSULE ORAL NIGHTLY
Qty: 90 CAPSULE | Refills: 1 | Status: SHIPPED | OUTPATIENT
Start: 2025-04-17 | End: 2025-10-14

## 2025-04-17 ASSESSMENT — PATIENT HEALTH QUESTIONNAIRE - PHQ9
SUM OF ALL RESPONSES TO PHQ9 QUESTIONS 1 AND 2: 0
1. LITTLE INTEREST OR PLEASURE IN DOING THINGS: NOT AT ALL
2. FEELING DOWN, DEPRESSED OR HOPELESS: NOT AT ALL

## 2025-04-17 ASSESSMENT — ENCOUNTER SYMPTOMS
OCCASIONAL FEELINGS OF UNSTEADINESS: 0
LOSS OF SENSATION IN FEET: 0
DEPRESSION: 0

## 2025-04-17 NOTE — PROGRESS NOTES
Subjective   Patient ID: Karel Perez is a 59 y.o. male who presents for Follow-up (6 mo med fu. No questions, concerns, or complaints. /).    HPI  Patient presents today for 6-month checkup and refill of meds.  He states that he is taking his medicines as directed and is not having any side effects from them. He currently is without complaints.  His anxiety is currently stable.    Review of Systems   All other systems reviewed and are negative.      Objective   Vitals:  /87   Pulse 94   Temp 37.2 °C (99 °F)   Resp 20   Wt 103 kg (226 lb)   SpO2 91%   BMI 34.36 kg/m²     Physical Exam  Vitals reviewed.   Constitutional:       Appearance: He is well-developed. He is obese.   HENT:      Head: Normocephalic and atraumatic.      Right Ear: Tympanic membrane, ear canal and external ear normal.      Left Ear: Tympanic membrane, ear canal and external ear normal.      Nose: Nose normal.      Mouth/Throat:      Mouth: Mucous membranes are moist.      Pharynx: Oropharynx is clear.   Eyes:      Extraocular Movements: Extraocular movements intact.      Conjunctiva/sclera: Conjunctivae normal.      Pupils: Pupils are equal, round, and reactive to light.   Cardiovascular:      Rate and Rhythm: Normal rate and regular rhythm.      Heart sounds: Normal heart sounds. No murmur heard.  Pulmonary:      Effort: Pulmonary effort is normal.      Breath sounds: Normal breath sounds. No wheezing.   Abdominal:      General: Abdomen is flat. Bowel sounds are normal.      Palpations: Abdomen is soft.   Musculoskeletal:         General: Normal range of motion.   Skin:     General: Skin is warm and dry.   Neurological:      General: No focal deficit present.      Mental Status: He is alert and oriented to person, place, and time. Mental status is at baseline.   Psychiatric:         Mood and Affect: Mood normal.         Behavior: Behavior normal.         Thought Content: Thought content normal.         Judgment: Judgment normal.          CBC -  Recent Labs     03/15/24  0932 03/18/23  0830 08/20/22  0929   WBC 7.3 8.6 9.3   HGB 17.3 17.1 17.7*   HCT 50.4 51.5 50.8    268 267   MCV 92 94 92       CMP -  Recent Labs     03/15/24  0932 03/18/23  0830 03/26/22  1052    140 136   K 4.2 4.7 3.9    105 101   CO2 27 27 28   ANIONGAP 12 13 11   BUN 11 11 10   CREATININE 0.75 0.81 0.80   EGFR >90  --   --      Recent Labs     03/15/24  0932 03/18/23  0830 03/26/22  1052   ALBUMIN 4.4 4.5 4.3   ALKPHOS 99 105 80   ALT 21 27 15   AST 15 17 16   BILITOT 0.5 0.4 0.3       LIPID PANEL -   Recent Labs     03/15/24  0932 03/18/23  0830 08/20/22  0929 03/26/22  1052   CHOL 131 147 146 210*   LDLCALC 61  --   --   --    LDLF  --  56 73 114*   HDL 46.3 48.0 50.0 45.0   TRIG 119 217* 117 254*       Recent Labs     03/15/24  0932   HGBA1C 5.7*       Lab Results   Component Value Date    FAUGUVMQ54 405 03/15/2024       Lab Results   Component Value Date    VITD25 39 10/18/2024        Assessment/Plan   Problem List Items Addressed This Visit       Cyclic vomiting syndrome    Relevant Medications    nortriptyline (Pamelor) 50 mg capsule    GERD (gastroesophageal reflux disease)    Relevant Medications    omeprazole (PriLOSEC) 40 mg DR capsule    Hyperlipidemia - Primary    Relevant Medications    atorvastatin (Lipitor) 40 mg tablet     Other Visit Diagnoses         Vitamin B12 deficiency        Relevant Orders    CBC    Vitamin B12      Screening for metabolic disorder        Relevant Orders    Comprehensive Metabolic Panel      Hyperglycemia        Relevant Orders    Hemoglobin A1C      Hyperlipidemia, mixed        Relevant Orders    Lipid Panel      Vitamin D deficiency        Relevant Orders    Vitamin D 25-Hydroxy,Total (for eval of Vitamin D levels)      Benign prostatic hyperplasia, unspecified whether lower urinary tract symptoms present        Relevant Orders    Prostate Specific Antigen        Will contact patient with lab results when  available.  Continue current meds as directed.  Follow up in 6 months for recheck if all remains stable, sooner if problems arise.  Medication list reconciled.  Thank you for visiting today!      Professional services: Some of this note was completed using Dragon voice technology and sometimes the software misinterprets words. This may include unintended errors with respect to translation of words, typographical errors or grammar errors which may not have been identified prior to finalization of the chart note. Please take this into account when reading the note. Thank you.       Chano Scott DO 04/18/25 7:31 AM

## 2025-04-20 LAB
25(OH)D3+25(OH)D2 SERPL-MCNC: 53 NG/ML (ref 30–100)
ALBUMIN SERPL-MCNC: 4.5 G/DL (ref 3.6–5.1)
ALP SERPL-CCNC: 102 U/L (ref 35–144)
ALT SERPL-CCNC: 31 U/L (ref 9–46)
ANION GAP SERPL CALCULATED.4IONS-SCNC: 11 MMOL/L (CALC) (ref 7–17)
AST SERPL-CCNC: 18 U/L (ref 10–35)
BILIRUB SERPL-MCNC: 0.5 MG/DL (ref 0.2–1.2)
BUN SERPL-MCNC: 13 MG/DL (ref 7–25)
CALCIUM SERPL-MCNC: 9.2 MG/DL (ref 8.6–10.3)
CHLORIDE SERPL-SCNC: 103 MMOL/L (ref 98–110)
CHOLEST SERPL-MCNC: 172 MG/DL
CHOLEST/HDLC SERPL: 3.2 (CALC)
CO2 SERPL-SCNC: 26 MMOL/L (ref 20–32)
CREAT SERPL-MCNC: 0.79 MG/DL (ref 0.7–1.3)
EGFRCR SERPLBLD CKD-EPI 2021: 102 ML/MIN/1.73M2
ERYTHROCYTE [DISTWIDTH] IN BLOOD BY AUTOMATED COUNT: 12.6 % (ref 11–15)
EST. AVERAGE GLUCOSE BLD GHB EST-MCNC: 134 MG/DL
EST. AVERAGE GLUCOSE BLD GHB EST-SCNC: 7.4 MMOL/L
GLUCOSE SERPL-MCNC: 123 MG/DL (ref 65–99)
HBA1C MFR BLD: 6.3 %
HCT VFR BLD AUTO: 48.9 % (ref 38.5–50)
HDLC SERPL-MCNC: 54 MG/DL
HGB BLD-MCNC: 16.4 G/DL (ref 13.2–17.1)
LDLC SERPL CALC-MCNC: 94 MG/DL (CALC)
MCH RBC QN AUTO: 31.1 PG (ref 27–33)
MCHC RBC AUTO-ENTMCNC: 33.5 G/DL (ref 32–36)
MCV RBC AUTO: 92.8 FL (ref 80–100)
NONHDLC SERPL-MCNC: 118 MG/DL (CALC)
PLATELET # BLD AUTO: 261 THOUSAND/UL (ref 140–400)
PMV BLD REES-ECKER: 10.5 FL (ref 7.5–12.5)
POTASSIUM SERPL-SCNC: 4.3 MMOL/L (ref 3.5–5.3)
PROT SERPL-MCNC: 7 G/DL (ref 6.1–8.1)
PSA SERPL-MCNC: 2.22 NG/ML
RBC # BLD AUTO: 5.27 MILLION/UL (ref 4.2–5.8)
SODIUM SERPL-SCNC: 140 MMOL/L (ref 135–146)
TRIGL SERPL-MCNC: 142 MG/DL
VIT B12 SERPL-MCNC: 586 PG/ML (ref 200–1100)
WBC # BLD AUTO: 8.2 THOUSAND/UL (ref 3.8–10.8)